# Patient Record
Sex: FEMALE
[De-identification: names, ages, dates, MRNs, and addresses within clinical notes are randomized per-mention and may not be internally consistent; named-entity substitution may affect disease eponyms.]

---

## 2017-06-04 ENCOUNTER — HOSPITAL ENCOUNTER (EMERGENCY)
Dept: HOSPITAL 31 - C.ER | Age: 47
Discharge: HOME | End: 2017-06-04
Payer: SELF-PAY

## 2017-06-04 VITALS
DIASTOLIC BLOOD PRESSURE: 68 MMHG | HEART RATE: 73 BPM | TEMPERATURE: 98.4 F | SYSTOLIC BLOOD PRESSURE: 100 MMHG | OXYGEN SATURATION: 95 % | RESPIRATION RATE: 18 BRPM

## 2017-06-04 DIAGNOSIS — B02.9: Primary | ICD-10-CM

## 2017-06-04 NOTE — C.PDOC
History Of Present Illness


Patient is a 46 year old female who presents to the ER with a complaint of an 

itchy rash under her right breast since Wednesday. Patient notes that she had a 

burning sensation to the same area a few days prior to rash onset. Patient 

denies SOB, difficulty swallowing or throat swelling.


Time Seen by Provider: 06/04/17 10:34


Chief Complaint (Nursing): Abnormal Skin Integrity


History Per: Patient


History/Exam Limitations: no limitations


Onset/Duration Of Symptoms: Days (Since Wednesday)


Current Symptoms Are (Timing): Still Present


Quality Of Symptoms: Itching


Recent travel outside of the [place]: No





Past Medical History


Reviewed: Historical Data, Nursing Documentation, Vital Signs


Vital Signs: 


 Last Vital Signs











Temp  98.4 F   06/04/17 09:53


 


Pulse  73   06/04/17 09:53


 


Resp  18   06/04/17 09:53


 


BP  100/68   06/04/17 09:53


 


Pulse Ox  95   06/04/17 11:51














- Medical History


PMH: Pneumonia (4 months prior)





- [facility] Procedures








RESECTION OF GALLBLADDER, PERCUTANEOUS ENDOSCOPIC APPROACH (07/05/16)








Family History: States: Unknown Family Hx





- Social History


Hx Tobacco Use: No


Hx Alcohol Use: No


Hx Substance Use: No





- Immunization History


Hx Tetanus Toxoid Vaccination: No


Hx Influenza Vaccination: No


Hx Pneumococcal Vaccination: No





Review Of Systems


Except As Marked, All Systems Reviewed And Found Negative.


ENT: Negative for: Throat Swelling, Other (Difficulty swallowing)


Respiratory: Negative for: Shortness of Breath


Skin: Positive for: Rash (Under right breast)





Physical Exam





- Physical Exam


Appears: Well, Non-toxic, No Acute Distress


Skin: Normal Color, Warm, Dry, Rash (Vesicular lesions consistent with herpes 

zoster. ), Other (No cellulitis)


Head: Atraumatic, Normacephalic


Oral Mucosa: Moist


Neck: Normal, Supple


Chest: Symmetrical, No Tenderness


Cardiovascular: Rhythm Regular, No Murmur


Respiratory: Other (No respiratory distress, patient speaking in complete 

sentences)


Neurological/Psych: Oriented x3, Normal Speech, Normal Cognition





ED Course And Treatment


O2 Sat by Pulse Oximetry: 95





Medical Decision Making


Medical Decision Making: 





Patient advised contact precautions may not have significant improvement due to 

onset timing of symptoms, patient advised to take OTC pain medication as needed.





Disposition


Counseled Patient/Family Regarding: Diagnosis, Need For Followup, Rx Given





- Disposition


Referrals: 


 Service [Outside]


[facility] [Outside]


Disposition: HOME/ ROUTINE


Disposition Time: 10:45


Condition: GOOD


Prescriptions: 


Acyclovir [Zovirax] 800 mg PO 5XD #35 tab


Instructions:  Shingles (ED)


Print Language: Bengali





- Clinical Impression


Clinical Impression: 


 Herpes zoster








- Scribe Statement


The provider has reviewed the documentation as recorded by the Scribgeeta Ortiz





All medical record entries made by the Ericibgeeta were at my direction and 

personally dictated by me. I have reviewed the chart and agree that the record 

accurately reflects my personal performance of the history, physical exam, 

medical decision making, and the department course for this patient. I have 

also personally directed, reviewed, and agree with the discharge instructions 

and disposition.

## 2017-06-06 ENCOUNTER — HOSPITAL ENCOUNTER (EMERGENCY)
Dept: HOSPITAL 31 - C.ER | Age: 47
Discharge: HOME | End: 2017-06-06
Payer: SELF-PAY

## 2017-06-06 VITALS — SYSTOLIC BLOOD PRESSURE: 100 MMHG | DIASTOLIC BLOOD PRESSURE: 65 MMHG | HEART RATE: 68 BPM

## 2017-06-06 VITALS — TEMPERATURE: 97.8 F | RESPIRATION RATE: 18 BRPM

## 2017-06-06 VITALS — BODY MASS INDEX: 31.7 KG/M2

## 2017-06-06 DIAGNOSIS — B02.23: Primary | ICD-10-CM

## 2017-06-06 NOTE — C.PDOC
History Of Present Illness


46 yr old female presents to the ER with complaints of increasing rash to the 

right chest zoster and persistent pain. Patient was seen on 6/4 for similar 

symptoms. Patient states she has been taking acyclovir as prescribed. Denies 

fever, chest pain, SOB or headache. 








VIA TRANS





CO INCR RASH R CHEST ZOSTER, PERSIST PAIN. SEEN 6/4 FOR SAME. TAKING ACYCLOVIR 

AS PRESCRIBED. NO OTHER ASSOC SX





EXAM


NAD


SKIN ZOSTER R CHEST WALL, NEW LESIONS LATERAL CHEST WALL COMPARED TO INITIAL. 

NO CELLULITIS


Time Seen by Provider: 06/06/17 18:35


Chief Complaint (Nursing): Abnormal Skin Integrity


History Per: Patient, 


History/Exam Limitations: no limitations


Onset/Duration Of Symptoms: Days


Current Symptoms Are (Timing): Still Present





Past Medical History


Reviewed: Historical Data, Nursing Documentation, Vital Signs


Vital Signs: 


 Last Vital Signs











Temp  97.8 F   06/06/17 18:23


 


Pulse  68   06/06/17 19:00


 


Resp  18   06/06/17 19:00


 


BP  100/65   06/06/17 19:00


 


Pulse Ox  95   06/08/17 07:31














- Medical History


PMH: Pneumonia (4 months prior)





- [facility] Procedures








RESECTION OF GALLBLADDER, PERCUTANEOUS ENDOSCOPIC APPROACH (07/05/16)








Family History: States: No Known Family Hx





- Social History


Hx Tobacco Use: No


Hx Alcohol Use: No


Hx Substance Use: No





- Immunization History


Hx Tetanus Toxoid Vaccination: No


Hx Influenza Vaccination: No


Hx Pneumococcal Vaccination: No





Review Of Systems


Except As Marked, All Systems Reviewed And Found Negative.


Constitutional: Negative for: Fever


Cardiovascular: Negative for: Chest Pain


Respiratory: Negative for: Shortness of Breath


Skin: Positive for: Rash (Zoster rash to the rigth chest )


Neurological: Negative for: Headache





Physical Exam





- Physical Exam


Appears: Well, Non-toxic, No Acute Distress


Skin: Warm, Dry, Other (Zoster, right chest wall, new lesions lateral chest 

wall compared to initial. No cellulitis. )


Head: Atraumatic, Normacephalic


Eye(s): bilateral: Normal Inspection, PERRL, EOMI


Lips: Normal Appearing, No Swelling


Throat: Normal, No Erythema, No Exudate


Chest: Symmetrical, No Tenderness


Cardiovascular: Rhythm Regular, No Murmur


Respiratory: Normal Breath Sounds, No Rales, No Rhonchi


Extremity: Normal ROM, No Swelling


Neurological/Psych: Oriented x3, Normal Speech, Normal Motor





ED Course And Treatment


O2 Sat by Pulse Oximetry: 95





Medical Decision Making


Medical Decision Making: 


PLAN:


* Percocet PO 








Disposition


Counseled Patient/Family Regarding: Diagnosis, Need For Followup, Rx Given





- Disposition


Referrals: 


[facility] [Outside]


[facility] [Outside]


Disposition: HOME/ ROUTINE


Disposition Time: 18:39


Condition: GOOD


Prescriptions: 


DiphenhydrAMINE [Benadryl] 50 mg PO TID PRN #30 cap


 PRN Reason: Itching / Pruritus


Ibuprofen [Motrin] 600 mg PO Q6 #30 tab


oxyCODONE/Acetaminophen [Percocet 5/325 mg Tab] 1 tab PO QID PRN #14 tab


 PRN Reason: Pain


Instructions:  Shingles (ED)


Print Language: Kuwaiti





- Clinical Impression


Clinical Impression: 


 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








- Scribe Statement


The provider has reviewed the documentation as recorded by the Leonard Mckinney


Provider Attestation: 


All medical record entries made by the Ericibgeeta were at my direction and 

personally dictated by me. I have reviewed the chart and agree that the record 

accurately reflects my personal performance of the history, physical exam, 

medical decision making, and the department course for this patient. I have 

also personally directed, reviewed, and agree with the discharge instructions 

and disposition.

## 2017-06-08 VITALS — OXYGEN SATURATION: 95 %

## 2017-06-21 ENCOUNTER — HOSPITAL ENCOUNTER (EMERGENCY)
Dept: HOSPITAL 31 - C.ER | Age: 47
Discharge: HOME | End: 2017-06-21
Payer: SELF-PAY

## 2017-06-21 VITALS
TEMPERATURE: 97.5 F | HEART RATE: 72 BPM | RESPIRATION RATE: 20 BRPM | OXYGEN SATURATION: 99 % | SYSTOLIC BLOOD PRESSURE: 99 MMHG | DIASTOLIC BLOOD PRESSURE: 64 MMHG

## 2017-06-21 VITALS — BODY MASS INDEX: 31.7 KG/M2

## 2017-06-21 DIAGNOSIS — K52.9: Primary | ICD-10-CM

## 2017-06-21 LAB
ALBUMIN/GLOB SERPL: 1.1 {RATIO} (ref 1–2.1)
ALP SERPL-CCNC: 116 U/L (ref 38–126)
ALT SERPL-CCNC: 38 U/L (ref 9–52)
AST SERPL-CCNC: 24 U/L (ref 14–36)
BASOPHILS # BLD AUTO: 0 K/UL (ref 0–0.2)
BASOPHILS NFR BLD: 0.3 % (ref 0–2)
BILIRUB SERPL-MCNC: 0.5 MG/DL (ref 0.2–1.3)
BILIRUB UR-MCNC: NEGATIVE MG/DL
BUN SERPL-MCNC: 7 MG/DL (ref 7–17)
CALCIUM SERPL-MCNC: 8.5 MG/DL (ref 8.6–10.4)
CHLORIDE SERPL-SCNC: 107 MMOL/L (ref 98–107)
CO2 SERPL-SCNC: 18 MMOL/L (ref 22–30)
EOSINOPHIL # BLD AUTO: 0.1 K/UL (ref 0–0.7)
EOSINOPHIL NFR BLD: 1.1 % (ref 0–4)
ERYTHROCYTE [DISTWIDTH] IN BLOOD BY AUTOMATED COUNT: 12.8 % (ref 11.5–14.5)
GLOBULIN SER-MCNC: 3.4 GM/DL (ref 2.2–3.9)
GLUCOSE SERPL-MCNC: 100 MG/DL (ref 65–105)
GLUCOSE UR STRIP-MCNC: NORMAL MG/DL
HCT VFR BLD CALC: 41.8 % (ref 34–47)
KETONES UR STRIP-MCNC: NEGATIVE MG/DL
LEUKOCYTE ESTERASE UR-ACNC: (no result) LEU/UL
LYMPHOCYTES # BLD AUTO: 1.4 K/UL (ref 1–4.3)
LYMPHOCYTES NFR BLD AUTO: 25.7 % (ref 20–40)
MCH RBC QN AUTO: 28.9 PG (ref 27–31)
MCHC RBC AUTO-ENTMCNC: 33.4 G/DL (ref 33–37)
MCV RBC AUTO: 86.5 FL (ref 81–99)
MONOCYTES # BLD: 0.7 K/UL (ref 0–0.8)
MONOCYTES NFR BLD: 13.2 % (ref 0–10)
NRBC BLD AUTO-RTO: 0.1 % (ref 0–2)
PH UR STRIP: 6 [PH] (ref 5–8)
PLATELET # BLD: 219 K/UL (ref 130–400)
PMV BLD AUTO: 9.3 FL (ref 7.2–11.7)
POTASSIUM SERPL-SCNC: 3.1 MMOL/L (ref 3.6–5.2)
PROT SERPL-MCNC: 7 G/DL (ref 6.3–8.3)
PROT UR STRIP-MCNC: NEGATIVE MG/DL
RBC # UR STRIP: NEGATIVE /UL
RBC #/AREA URNS HPF: 3 /HPF (ref 0–3)
SODIUM SERPL-SCNC: 138 MMOL/L (ref 132–148)
SP GR UR STRIP: 1.02 (ref 1–1.03)
UROBILINOGEN UR-MCNC: NORMAL MG/DL (ref 0.2–1)
WBC # BLD AUTO: 5.3 K/UL (ref 4.8–10.8)
WBC #/AREA URNS HPF: 1 /HPF (ref 0–5)

## 2017-06-21 PROCEDURE — 96361 HYDRATE IV INFUSION ADD-ON: CPT

## 2017-06-21 PROCEDURE — 96375 TX/PRO/DX INJ NEW DRUG ADDON: CPT

## 2017-06-21 PROCEDURE — 96374 THER/PROPH/DIAG INJ IV PUSH: CPT

## 2017-06-21 PROCEDURE — 83690 ASSAY OF LIPASE: CPT

## 2017-06-21 PROCEDURE — 80053 COMPREHEN METABOLIC PANEL: CPT

## 2017-06-21 PROCEDURE — 85025 COMPLETE CBC W/AUTO DIFF WBC: CPT

## 2017-06-21 PROCEDURE — 81001 URINALYSIS AUTO W/SCOPE: CPT

## 2017-06-21 PROCEDURE — 84703 CHORIONIC GONADOTROPIN ASSAY: CPT

## 2017-06-21 PROCEDURE — 99285 EMERGENCY DEPT VISIT HI MDM: CPT

## 2017-06-21 NOTE — C.PDOC
History Of Present Illness


46 yr old female presents to the ER with complaints of nausea, vomiting, 

diarrhea and generalized body aches for the past 4 days. Patient denies fever, 

chest pain, SOB, abdominal pain, headache or numbness. 


Time Seen by Provider: 06/21/17 07:20


Chief Complaint (Nursing): Flu-like Symptoms


History Per: Patient


History/Exam Limitations: no limitations


Onset/Duration Of Symptoms: Days (4)


Current Symptoms Are (Timing): Still Present


Sick Contacts (Context): None


Recent travel outside of the [place]: No





Past Medical History


Reviewed: Historical Data, Nursing Documentation, Vital Signs


Vital Signs: 


 Last Vital Signs











Temp  97.8 F   06/21/17 07:24


 


Pulse  71   06/21/17 08:12


 


Resp  16   06/21/17 08:12


 


BP  101/66   06/21/17 08:12


 


Pulse Ox  98   06/21/17 10:06














- Medical History


PMH: Pneumonia (4 months prior)


Surgical History: Cholecystectomy





- [facility] Procedures








RESECTION OF GALLBLADDER, PERCUTANEOUS ENDOSCOPIC APPROACH (07/05/16)








Family History: States: No Known Family Hx





- Social History


Hx Tobacco Use: No


Hx Alcohol Use: No


Hx Substance Use: No





- Immunization History


Hx Tetanus Toxoid Vaccination: No


Hx Influenza Vaccination: No


Hx Pneumococcal Vaccination: No





Review Of Systems


Except As Marked, All Systems Reviewed And Found Negative.


Constitutional: Positive for: Weakness (Generalized).  Negative for: Fever


Cardiovascular: Negative for: Chest Pain


Respiratory: Negative for: Shortness of Breath


Gastrointestinal: Positive for: Nausea, Vomiting, Diarrhea.  Negative for: 

Abdominal Pain


Neurological: Negative for: Numbness, Headache





Physical Exam





- Physical Exam


Appears: Well, Non-toxic, No Acute Distress


Skin: Warm, Dry, No Rash


Head: Atraumatic, Normacephalic


Oral Mucosa: Moist


Throat: Normal, No Erythema, No Exudate, No Drooling


Neck: Normal, Normal ROM, Supple


Chest: Symmetrical, No Tenderness


Cardiovascular: Rhythm Regular, No Murmur


Respiratory: Normal Breath Sounds, No Rales, No Rhonchi, No Wheezing


Gastrointestinal/Abdominal: Normal Exam, Soft, No Tenderness, No Guarding, No 

Rebound


Extremity: Normal ROM, No Swelling


Neurological/Psych: Oriented x3, Normal Speech, Normal Motor





ED Course And Treatment





- Laboratory Results


Result Diagrams: 


 06/21/17 07:57





 06/21/17 07:57


Lab Interpretation: No Acute Changes


Urine Pregnancy POC: Negative


O2 Sat by Pulse Oximetry: 98


Progress Note: Treated with IVF NSS and reglan.  On re-evaluation continued to C

/O nausea.  Treated with reglan IV.  On re-evaluation abdomen soft, requesting 

discharge


Reassessment Condition: Improved





Medical Decision Making


Medical Decision Making: 


PLAN:


* CBC


* CMP


* HCG


* Urinalysis 


* Zofran IVP


* Sodium Chloride IV 








Disposition


Counseled Patient/Family Regarding: Studies Performed, Diagnosis





- Disposition


Referrals: 


[facility] [Outside]


[facility] [Outside]


Disposition: HOME/ ROUTINE


Disposition Time: 10:10


Condition: STABLE


Additional Instructions: 


retuen to ED if any increase symptoms


Prescriptions: 


Ondansetron ODT [Zofran ODT] 1 odt PO BID PRN #6 odt


 PRN Reason: Nausea/Vomiting


Instructions:  Gastroenteritis (ED)


Print Language: Argentine





- POA


Present On Arrival: None





- Clinical Impression


Clinical Impression: 


 Gastroenteritis








- PA / NP / Resident Statement


MD/DO has reviewed & agrees with the documentation as recorded.





- Scribe Statement


The provider has reviewed the documentation as recorded by the Scribe


Patricia Mckinney





All medical record entries made by the Ericibgeeta were at my direction and 

personally dictated by me. I have reviewed the chart and agree that the record 

accurately reflects my personal performance of the history, physical exam, 

medical decision making, and the department course for this patient. I have 

also personally directed, reviewed, and agree with the discharge instructions 

and disposition.

## 2017-12-22 ENCOUNTER — HOSPITAL ENCOUNTER (EMERGENCY)
Dept: HOSPITAL 31 - C.ER | Age: 47
Discharge: HOME | End: 2017-12-22
Payer: SELF-PAY

## 2017-12-22 VITALS — SYSTOLIC BLOOD PRESSURE: 110 MMHG | HEART RATE: 70 BPM | RESPIRATION RATE: 14 BRPM | DIASTOLIC BLOOD PRESSURE: 70 MMHG

## 2017-12-22 VITALS — BODY MASS INDEX: 39.2 KG/M2

## 2017-12-22 VITALS — TEMPERATURE: 98.2 F | OXYGEN SATURATION: 97 %

## 2017-12-22 DIAGNOSIS — R07.89: Primary | ICD-10-CM

## 2017-12-22 NOTE — C.PDOC
History Of Present Illness


46 yo female with 1 month of reproducible chest wall pain with palpation. ??

SOB. No fever and no cough


Time Seen by Provider: 12/22/17 22:37


Chief Complaint (Nursing): Chest Pain


History Per: Patient


Onset/Duration Of Symptoms: Gradual


Current Symptoms Are (Timing): Still Present


Severity: Mild


Pain Scale Rating Of: 3


Quality: Dull


Associated Symptoms: Dyspnea





Past Medical History


Vital Signs: 


 Last Vital Signs











Temp  98.2 F   12/22/17 21:57


 


Pulse  70   12/22/17 22:57


 


Resp  14   12/22/17 22:57


 


BP  110/70   12/22/17 22:57


 


Pulse Ox  97   12/22/17 22:57














- Medical History


PMH: Pneumonia


   Denies: Chronic Kidney Disease


Other PMH: none


Surgical History: Cholecystectomy





- [facility] Procedures








RESECTION OF GALLBLADDER, PERCUTANEOUS ENDOSCOPIC APPROACH (07/05/16)








Family History: States: No Known Family Hx





- Social History


Hx Tobacco Use: No


Hx Alcohol Use: No


Hx Substance Use: No





- Immunization History


Hx Tetanus Toxoid Vaccination: No


Hx Influenza Vaccination: No


Hx Pneumococcal Vaccination: No





Review Of Systems


Constitutional: Negative for: Fever


Eyes: Negative for: Pain


Cardiovascular: Positive for: Chest Pain.  Negative for: Palpitations, Edema


Respiratory: Positive for: Shortness of Breath, SOB with Excertion.  Negative 

for: Cough


Gastrointestinal: Negative for: Nausea, Vomiting


Musculoskeletal: Positive for: Back Pain.  Negative for: Neck Pain


Psych: Negative for: Anxiety





Physical Exam





- Physical Exam


Appears: Well, Non-toxic


Skin: Normal Color, No Cyanotic


Head: Atraumatic, Normacephalic


Eye(s): bilateral: Normal Inspection, PERRL, EOMI


Neck: Normal, Normal ROM


Lymphatic: Normal Exam


Chest: Symmetrical, No Ecchymosis, No Subcutaneous Emphysema


Cardiovascular: Rhythm Regular, Rhythm Irregular


Respiratory: Normal Breath Sounds, No Rales, No Rhonchi


Gastrointestinal/Abdominal: Normal Exam, No Tenderness


Extremity: Normal ROM, No Tenderness





ED Course And Treatment


ECG: Interpreted By Me


ECG Rhythm: Sinus Rhythm


ECG Interpretation: Normal


O2 Sat by Pulse Oximetry: 97





Disposition





- Disposition


Disposition: HOME/ ROUTINE


Disposition Time: 22:45


Condition: STABLE


Prescriptions: 


Naproxen [Naprosyn] 500 mg PO BID PRN #14 tablet


 PRN Reason: Pain, Moderate (4-7)


Instructions:  Chest Wall Pain (ED)


Forms:  Gen Discharge Inst Greek





- Clinical Impression


Clinical Impression: 


 Chest wall pain

## 2018-06-18 ENCOUNTER — HOSPITAL ENCOUNTER (EMERGENCY)
Dept: HOSPITAL 31 - C.ER | Age: 48
Discharge: HOME | End: 2018-06-18
Payer: COMMERCIAL

## 2018-06-18 VITALS
OXYGEN SATURATION: 96 % | DIASTOLIC BLOOD PRESSURE: 79 MMHG | TEMPERATURE: 98 F | SYSTOLIC BLOOD PRESSURE: 117 MMHG | HEART RATE: 73 BPM | RESPIRATION RATE: 20 BRPM

## 2018-06-18 VITALS — BODY MASS INDEX: 35.7 KG/M2

## 2018-06-18 DIAGNOSIS — M72.2: Primary | ICD-10-CM

## 2018-06-18 NOTE — C.PDOC
History Of Present Illness


48yo female comes to ER for evaluation of right plantar heel pain. She states 

she stands all day for her job, which might have caused the injury. She denies 

any trauma, injury, weakness, numbness or tingling. 


Time Seen by Provider: 06/18/18 15:15


Chief Complaint (Nursing): Lower Extremity Problem/Injury


History Per: Patient


History/Exam Limitations: no limitations


Onset/Duration Of Symptoms: Days


Current Symptoms Are (Timing): Still Present


Additional History Per: Patient





Past Medical History


Reviewed: Historical Data, Nursing Documentation, Vital Signs


Vital Signs: 


 Last Vital Signs











Temp  98 F   06/18/18 14:23


 


Pulse  73   06/18/18 14:23


 


Resp  20   06/18/18 14:23


 


BP  117/79   06/18/18 14:23


 


Pulse Ox  96   06/18/18 18:26














- Medical History


PMH: No Chronic Diseases, Pneumonia


   Denies: Chronic Kidney Disease


Surgical History: Cholecystectomy





- [facility] Procedures








RESECTION OF GALLBLADDER, PERCUTANEOUS ENDOSCOPIC APPROACH (07/05/16)








Family History: States: No Known Family Hx





- Social History


Hx Tobacco Use: No


Hx Alcohol Use: No


Hx Substance Use: No





- Immunization History


Hx Tetanus Toxoid Vaccination: No


Hx Influenza Vaccination: No


Hx Pneumococcal Vaccination: No





Review Of Systems


Except As Marked, All Systems Reviewed And Found Negative.


Constitutional: Positive for: Other (weight gain due to poor diet)


Musculoskeletal: Positive for: Foot Pain (right plantar heel )


Neurological: Negative for: Weakness, Numbness





Physical Exam





- Physical Exam


Appears: Non-toxic, No Acute Distress


Skin: Normal Color, Warm, Dry


Head: Atraumatic, Normacephalic


Eye(s): bilateral: Normal Inspection, EOMI


Nose: Normal


Oral Mucosa: Moist


Neck: Normal ROM, Supple


Chest: Symmetrical


Cardiovascular: Rhythm Regular


Respiratory: Normal Breath Sounds


Extremity: Normal ROM, Tenderness (mild tenderness right plantar heel surface), 

No Pedal Edema, No Calf Tenderness, No Deformity, No Swelling


Neurological/Psych: Oriented x3





ED Course And Treatment


O2 Sat by Pulse Oximetry: 96 (RA)


Pulse Ox Interpretation: Normal





- Other Rad


  ** R foot


X-Ray: Interpreted by Me (no fx/disloc/heel spurs)


Progress Note: ice pack/motrin PO


Reevaluation Time: 15:53


Reassessment Condition: Improved





Medical Decision Making


Medical Decision Making: 





heel spurs vs plantar fasciitis


nsaids, weight loss


POdiatry f/u. 





Disposition


Doctor Will See Patient In The: Office


Counseled Patient/Family Regarding: Studies Performed, Diagnosis





- Disposition


Referrals: 


Aron Ward DPM [Staff Provider] - 


Osman Gonzalez DPM [Staff Provider] - 


Disposition: HOME/ ROUTINE


Disposition Time: 16:02


Condition: GOOD


Additional Instructions: 


ibuprofeno 400-600 mg cada 6 horas sammie necessario


bolsa/botella de hielo 1/2 hora por hora


nada caliente


[place]


Sigue con POdiatria- llama para hacer tian





Placas hoy normal. 


Instructions:  Heel Pain (Caused by Plantar Fasciitis)


Forms:  [employer] (English)


Print Language: English





- Clinical Impression


Clinical Impression: 


 Plantar fasciitis








- Scribe Statement


The provider has reviewed the documentation as recorded by the Scribe (Genia Mckeon)


Provider Attestation: 


All medical record entries made by the Scribe were at my direction and 

personally dictated by me. I have reviewed the chart and agree that the record 

accurately reflects my personal performance of the history, physical exam, 

medical decision making, and the department course for this patient. I have 

also personally directed, reviewed, and agree with the discharge instructions 

and disposition.

## 2018-06-18 NOTE — RAD
PROCEDURE:  Right Foot Radiographs.



HISTORY:

R plantar heel pain x 2 weeks, no injury  



COMPARISON:

None.



FINDINGS:



BONES:

Normal. No fracture. 



JOINTS:

Normal. 



SOFT TISSUES:

Normal. 



OTHER FINDINGS:

None.



IMPRESSION:

Normal right foot radiographs.